# Patient Record
Sex: MALE | Race: OTHER | HISPANIC OR LATINO | Employment: OTHER | URBAN - METROPOLITAN AREA
[De-identification: names, ages, dates, MRNs, and addresses within clinical notes are randomized per-mention and may not be internally consistent; named-entity substitution may affect disease eponyms.]

---

## 2021-09-22 ENCOUNTER — HOSPITAL ENCOUNTER (EMERGENCY)
Facility: MEDICAL CENTER | Age: 36
End: 2021-09-22
Attending: EMERGENCY MEDICINE

## 2021-09-22 VITALS
HEIGHT: 66 IN | BODY MASS INDEX: 22.75 KG/M2 | DIASTOLIC BLOOD PRESSURE: 102 MMHG | TEMPERATURE: 97.4 F | OXYGEN SATURATION: 97 % | HEART RATE: 96 BPM | SYSTOLIC BLOOD PRESSURE: 144 MMHG | RESPIRATION RATE: 18 BRPM | WEIGHT: 141.54 LBS

## 2021-09-22 PROCEDURE — 302449 STATCHG TRIAGE ONLY (STATISTIC)

## 2021-10-09 NOTE — ED TRIAGE NOTES
"Chief Complaint   Patient presents with   • Back Pain     thoracic back pain x5 days after lifting something heavy.      Pt ambulatory to triage for above. Denies numbness/tingling. denies issues with bowel or bladder.   NAD noted. VSS.    /102   Pulse 96   Temp 36.3 °C (97.4 °F) (Temporal)   Resp 18   Ht 1.676 m (5' 6\")   Wt 64.2 kg (141 lb 8.6 oz)   SpO2 97%   BMI 22.84 kg/m²     " Patient signed out to incoming physician.  All decisions regarding the progression of care will be made at their discretion.